# Patient Record
Sex: FEMALE | Race: WHITE
[De-identification: names, ages, dates, MRNs, and addresses within clinical notes are randomized per-mention and may not be internally consistent; named-entity substitution may affect disease eponyms.]

---

## 2019-11-12 ENCOUNTER — HOSPITAL ENCOUNTER (EMERGENCY)
Dept: HOSPITAL 41 - JD.ED | Age: 29
Discharge: HOME | End: 2019-11-12
Payer: SELF-PAY

## 2019-11-12 DIAGNOSIS — F17.210: ICD-10-CM

## 2019-11-12 DIAGNOSIS — Z30.432: Primary | ICD-10-CM

## 2019-11-12 NOTE — EDM.PDOC
<Juanita Aquino - Last Filed: 19 18:14>





ED HPI GENERAL MEDICAL PROBLEM





- General


Chief Complaint: OB/GYN Problem


Stated Complaint: OB/GYN PROBLEM


Time Seen by Provider: 19 17:42





- Related Data


 Allergies











Allergy/AdvReac Type Severity Reaction Status Date / Time


 


No Known Allergies Allergy   Verified 19 17:33











Home Meds: 


 Home Meds





. [No Known Home Meds]  19 [History]











Course





- Vital Signs


Last Recorded V/S: 


 Last Vital Signs











Temp  97.8 F   19 17:30


 


Pulse  109 H  19 17:30


 


Resp  19   19 17:30


 


BP  133/89   19 17:30


 


Pulse Ox  97   19 17:30














- Orders/Labs/Meds


Orders: 


 Active Orders 24 hr











 Category Date Time Status


 


 Pelvic Exam, Set Up [RC] ASDIRECTED Care  19 17:49 Active














Departure





- Departure


Time of Disposition: 18:15


Disposition: Home, Self-Care 01


Clinical Impression: 


 Encounter for removal of intrauterine contraceptive device (IUD)








- Discharge Information


*PRESCRIPTION DRUG MONITORING PROGRAM REVIEWED*: No


*COPY OF PRESCRIPTION DRUG MONITORING REPORT IN PATIENT RONNY: No


Instructions:  Intrauterine Device Information


Referrals: 


PCP,None [Primary Care Provider] - 


Forms:  ED Department Discharge


Additional Instructions: 


You were seen in the emergency department tonight with a request to have your 

IUD removed.  Your IUD was removed without complications.  There was no 

evidence of embedding within the uterus.  We do recommend that she use a 

barrier method of contraceptive until being seen by an OB/GYN provider to be 

started on oral contraceptives. You were provided with a list of OB/GYN 

providers.  Please call to schedule an appointment at their earliest available 

time. Return to the ER for any additional problems or concerns.





- My Orders


Last 24 Hours: 


My Active Orders





19 17:49


Pelvic Exam, Set Up [RC] ASDIRECTED 














- Assessment/Plan


Last 24 Hours: 


My Active Orders





19 17:49


Pelvic Exam, Set Up [RC] ASDIRECTED 














ED  PROCEDURES





- Additional/Other Procedure(s)


Procedure(s) (Free Text): 


Patient was placed in the lithotomy position.  Cervix was fully visualized 

utilizing lighted speculum. Both strings of the IUD  were visualized.  IUD was 

removed utilizing ring forceps.  No resistance was noted.   Patient tolerated 

procedure well.








<Jersey Ayala - Last Filed: 19 18:24>





ED HPI GENERAL MEDICAL PROBLEM





- General


Source of Information: Reports: Patient


History Limitations: Reports: No Limitations





- History of Present Illness


INITIAL COMMENTS - FREE TEXT/NARRATIVE: 





Patient's unfortunate 29-year-old  female who presents to emergency 

Department today with complaint of I want my IUD out. Patient reports that she 

has become concerned after reading on the Internet that her IUD may be a 

problem after eating in for so long. Patient reports that she had her IUD 

placed in . Patient is not looking to get pregnant however, she is afraid 

that  he will call scarring. No fever no chills no vaginal discharge no 

dysuria no frequency or urgency. Patient reports that she did have a chlamydia 

and gonorrhea test and HIV test 2 weeks ago which were all negative.





Past Medical History


OB/GYN History: Reports: Pregnancy


Psychiatric History: Reports: Anxiety, Depression





- Infectious Disease History


Infectious Disease History: Reports: Chicken Pox, Shingles





Social & Family History





- Family History


Family Medical History: Noncontributory





- Tobacco Use


Smoking Status *Q: Current Every Day Smoker


Years of Tobacco use: 13


Packs/Tins Daily: 0.5





- Caffeine Use


Caffeine Use: Reports: Coffee, Tea


Caffeine Use Comment: rarely





- Recreational Drug Use


Recreational Drug Use: No





ED ROS GENERAL





- Review of Systems


Review Of Systems: See Below


GI/Abdominal: Reports: No Symptoms


: Reports: No Symptoms





ED EXAM, GI/ABD





- Physical Exam


Exam: See Below


Exam Limited By: No Limitations


General Appearance: Alert, WD/WN, Mild Distress


Throat/Mouth: Normal Inspection, Normal Lips, Normal Teeth, Normal Gums, Normal 

Oropharynx, Normal Voice, No Airway Compromise


Neck: Normal Inspection, Supple, Non-Tender, Full Range of Motion


Respiratory/Chest: No Respiratory Distress, Lungs Clear, Normal Breath Sounds, 

No Accessory Muscle Use, Chest Non-Tender


Cardiovascular: Normal Peripheral Pulses, Regular Rate, Rhythm, No Edema, No 

Gallop, No JVD, No Murmur, No Rub


GI/Abdominal Exam: Normal Bowel Sounds, Soft, Non-Tender, No Organomegaly, No 

Distention, No Abnormal Bruit, No Mass, Pelvis Stable


Extremities: Normal Inspection, Normal Range of Motion, Non-Tender, Normal 

Capillary Refill, No Pedal Edema


Neurological: Alert, Oriented, CN II-XII Intact, Normal Cognition, Normal Gait, 

Normal Reflexes, No Motor/Sensory Deficits


Skin Exam: Warm, Dry, Intact, Normal Color, No Rash

## 2020-02-11 ENCOUNTER — HOSPITAL ENCOUNTER (EMERGENCY)
Dept: HOSPITAL 41 - JD.ED | Age: 30
Discharge: HOME | End: 2020-02-11
Payer: MEDICAID

## 2020-02-11 NOTE — EDM.PDOC
ED HPI GENERAL MEDICAL PROBLEM





- General


Chief Complaint: ENT Problem


Stated Complaint: POSSIBLE BROKEN NOSE


Time Seen by Provider: 02/11/20 20:03


Source of Information: Reports: Patient


History Limitations: Reports: No Limitations





- History of Present Illness


INITIAL COMMENTS - FREE TEXT/NARRATIVE: 


30-year-old female presents the ED for evaluation of blunt mid facial trauma 

primarily involving her nose. She states this occurred as she was entering her 

apartment building when the door open suddenly and struck her in the midface. 

She states that this is a large metal framed door.  backwards but not down 

to the ground. She had very active nose bleeding after the injury but it has 

subsequently stopped. She was able to blow up quite a bit of large clot. She 

was not aware of swallowing any blood. Has some pain at the bridge of her nose 

and is concerned that she has fractured her nose. She is not sure if she could 

be pregnant. She does not use any birth control. His any dental pain or some 

pain in her lip. There is some slight swelling of the right upper eyelid which 

she was not aware of. She does not wear glasses. Denies  being struck in the 

forehead at all.





Onset: Today


Onset Date: 02/11/20


Onset Time: 19:00


Duration: Minutes:


Location: Reports: Face


Quality: Reports: Ache


Severity: Moderate


Improves with: Reports: None


Worsens with: Reports: None


Context: Reports: Trauma (1 trauma from being struck by a large aluminum for 

steel door as she was entering it was opened by another patron of her apartment.

).  Denies: Activity, Exercise, Lifting, Sick Contact


Associated Symptoms: Reports: No Other Symptoms


Treatments PTA: Reports: Other (see below) (None.)


  ** Nose


Pain Score (Numeric/FACES): 5





- Related Data


 Allergies











Allergy/AdvReac Type Severity Reaction Status Date / Time


 


No Known Allergies Allergy   Verified 02/11/20 19:41











Home Meds: 


 Home Meds





. [No Known Home Meds]  11/12/19 [History]











Past Medical History


OB/GYN History: Reports: Pregnancy


Psychiatric History: Reports: Anxiety, Depression





- Infectious Disease History


Infectious Disease History: Reports: Chicken Pox, Shingles





Social & Family History





- Family History


Family Medical History: Noncontributory





- Caffeine Use


Caffeine Use: Reports: Coffee, Tea


Caffeine Use Comment: rarely





- Living Situation & Occupation


Living situation: Reports: Single


Occupation: Unemployed





ED ROS ENT





- Review of Systems


Review Of Systems: See Below


Constitutional: Reports: No Symptoms


HEENT: Reports: Nosebleed, Nose Pain


Respiratory: Reports: No Symptoms


Cardiovascular: Reports: No Symptoms


Endocrine: Reports: No Symptoms


GI/Abdominal: Reports: No Symptoms


: Reports: No Symptoms


Musculoskeletal: Reports: No Symptoms


Skin: Reports: No Symptoms


Neurological: Reports: No Symptoms





ED EXAM, ENT





- Physical Exam


Exam: See Below


Exam Limited By: No Limitations


General Appearance: Alert, WD/WN, Anxious, Mild Distress


Eye Exam: Bilateral Eye: Normal Inspection, PERRL, Other (There appears to be 

slight swelling of the lateral mid aspect of the right upper eyelid which the 

patient was not aware of.)


Nose: Other (There is no obvious deformity of the knee nose. Palpation does not 

reveal any deformities of the nasal septum or spine. [place] of the bridge of 

the nose. On inspection there is swelling of both medial and superior 

turbinates. On the left it is nearly closed and she has difficulty breathing 

through this side. On the right there is been some bleeding and menstrual 

bleeding mildly from the nasal septum with no septal hematoma.There is no 

fracture of the nose.)


Mouth/Throat: Normal Inspection, Normal Gums, Normal Lips, Normal Oropharynx, 

Normal Teeth, Other (No dental injuries.)


Head: Atraumatic ( Mandibular injuries.), Normocephalic


Neck: Normal Inspection, Supple, Non-Tender, Full Range of Motion.  No: 

Lymphadenopathy (L), Lymphadenopathy (R)





Course





- Vital Signs


Last Recorded V/S: 


 Last Vital Signs











Temp  37.2 C   02/11/20 19:35


 


Pulse  89   02/11/20 19:35


 


Resp  16   02/11/20 19:35


 


BP  123/86   02/11/20 19:35


 


Pulse Ox  99   02/11/20 19:35














- Radiology Interpretation


Free Text/Narrative:: 


30-year-old female presents to the ED with acute injury to the nose from a 

steel frame door opened right in front of her eyes she was entering her 

apartment. The door struck her in the midface primarily her nose causing nasal 

bleeding. Concern that she may have fractured her nose. On examination she 

moves her nose around very easily without any pain. On palpation clinically 

there is no nasal fracture. There is swelling on the anterior aspect of the 

nose without nasal septal hematoma. However the vomer appears to be midline. 

There is no pain in the maxillary sinuses or periorbital areas. Patient 

reassured clinically there is no fractured nose. Bleeding will stop on its own 

but congestion may last a couple of days. Follow  up as needed.








Departure





- Departure


Time of Disposition: 20:03


Disposition: Home, Self-Care 01


Condition: Fair


Clinical Impression: 


 Contusion of nose, initial encounter








- Discharge Information


*PRESCRIPTION DRUG MONITORING PROGRAM REVIEWED*: Not Applicable


*COPY OF PRESCRIPTION DRUG MONITORING REPORT IN PATIENT RONNY: Not Applicable


Instructions:  Contusion, Easy-to-Read


Referrals: 


PCP,None [Primary Care Provider] - 


Forms:  ED Department Discharge


Additional Instructions: 


Evaluation the emergency room tonight in regards to blunt trauma to the nose 

that occurred from a heavy metal door that struck you in the midface injuring 

her nose. Examination reveals that the nose clinically is not fractured. There 

is swelling on both sides of the naris particularly on the left side where it 

is narrowed from swelling. On the right side is where it has been bleeding 

from. No septal hematoma. Clinically I do not feel x-rays are indicated. 

Swelling and mucosal take a couple of days to go down and you should bale to 

breathe normally again. May be very tender to touch for up to a week to 10 

days. May get some blackening under both eyes from contusion to the nose. He 

will take a week to 10 days to go away. No or Motrin for pain relief if needed. 





Sepsis Event Note





- Evaluation


Sepsis Screening Result: No Definite Risk





- Focused Exam


Vital Signs: 


 Vital Signs











  Temp Pulse Resp BP Pulse Ox


 


 02/11/20 19:35  37.2 C  89  16  123/86  99











Date Exam was Performed: 02/11/20


Time Exam was Performed: 20:22

## 2021-03-14 ENCOUNTER — HOSPITAL ENCOUNTER (EMERGENCY)
Dept: HOSPITAL 41 - JD.ED | Age: 31
Discharge: HOME | End: 2021-03-14
Payer: MEDICAID

## 2021-03-14 DIAGNOSIS — K04.7: Primary | ICD-10-CM

## 2021-03-14 DIAGNOSIS — Z72.0: ICD-10-CM

## 2021-03-14 NOTE — EDM.PDOC
ED HPI GENERAL MEDICAL PROBLEM





- General


Chief Complaint: ENT Problem


Stated Complaint: TOOTH PAIN


Time Seen by Provider: 03/14/21 21:17


Source of Information: Reports: Patient


History Limitations: Reports: No Limitations





- History of Present Illness


INITIAL COMMENTS - FREE TEXT/NARRATIVE: 


81-year-old female presents to the ED complaining of dental pain right upper 

molars.  She cannot be sure whether this is the third or second molar because 

both of them are hurting.  No recent dental surgery on either tooth.  She has 

had previous fillings to these teeth.  The first molar is an implant.  Pain 

radiates up underneath her right eye and also down towards the mandible on the 

right side.  Difficult to chew on this side for the last 3 to 4 days.  Has been 

taking Motrin /Tylenol without any relief of the pain.





Onset: Gradual


Onset Date: 03/12/21


Duration: Day(s):, Getting Worse


Location: Reports: Face


Quality: Reports: Ache, Throbbing, Other (Upper molar teeth.  Stating pounding 

pain)


Severity: Moderate


Improves with: Reports: None (10)


Worsens with: Reports: Other


Associated Symptoms: Denies: No Other Symptoms, Confusion (To chew on that 

side.), Chest Pain, Cough, cough w sputum, Diaphoresis, Fever/Chills, Headaches,

Loss of Appetite, Malaise, Nausea/Vomiting, Seizure, Shortness of Breath, 

Syncope, Weakness


Treatments PTA: Reports: Acetaminophen, NSAIDS


  ** Right Lower Tooth/Teeth


Pain Score (Numeric/FACES): 9





- Related Data


                                    Allergies











Allergy/AdvReac Type Severity Reaction Status Date / Time


 


No Known Allergies Allergy   Verified 03/14/21 21:11











Home Meds: 


                                    Home Meds





Amoxicillin/Potassium Clav [Augmentin 875-125 Tablet] 1 each PO BID #20 tablet 

03/14/21 [Rx]


oxyCODONE HCl/Acetaminophen [Percocet 5-325 mg Tablet] 1 - 2 each PO Q4H PRN #18

tablet 03/14/21 [Rx]











Past Medical History





- Past Health History


Medical/Surgical History: Denies Medical/Surgical History


OB/GYN History: Reports: Pregnancy


Psychiatric History: Reports: Anxiety, Depression





- Infectious Disease History


Infectious Disease History: Reports: Chicken Pox, Shingles





Social & Family History





- Family History


Family Medical History: No Pertinent Family History





- Tobacco Use


Tobacco Use Status *Q: Current Every Day Tobacco User


Years of Tobacco use: 10


Packs/Tins Daily: 0.5





- Caffeine Use


Caffeine Use: Reports: Coffee


Caffeine Use Comment: rarely





- Living Situation & Occupation


Living situation: Reports: Single


Occupation: Unemployed





ED ROS ENT





- Review of Systems


Review Of Systems: See Below


Constitutional: Reports: Decreased Appetite (Belted shoe on the right side.).  

Denies: Fever, Chills, Malaise, Weakness, Fatigue, Weight Loss


HEENT: Reports: Dental Pain (Right upper molars are painful she believes third 

and second.  The first molar apparently is an implant.)


Respiratory: Reports: No Symptoms


Cardiovascular: Reports: No Symptoms


Endocrine: Reports: No Symptoms


GI/Abdominal: Reports: No Symptoms


: Reports: No Symptoms


Musculoskeletal: Reports: No Symptoms


Skin: Reports: No Symptoms


Neurological: Reports: No Symptoms


Psychiatric: Reports: No Symptoms


Hematologic/Lymphatic: Reports: No Symptoms





ED EXAM, ENT





- Physical Exam


Exam: See Below


Exam Limited By: No Limitations


General Appearance: Alert, WD/WN, Moderate Distress, Other (Temperature is 36.5 

degrees with a heart rate of 70.  O2 sats are 94% room air /88.  

Respiratory to 16.)


Ears: Normal External Exam


Nose: Normal Inspection


Mouth/Throat: Dental Pain (To pain on percussion with a tongue blade primarily 

from the third right upper molar tooth.).  No: Gum Swelling


Head: Atraumatic, Normocephalic


Neck: Normal Inspection, Supple, Non-Tender, Full Range of Motion, 

Lymphadenopathy (R).  No: Lymphadenopathy (L) (Right submandibular adenopathy.)


Cardiovascular: Normal Peripheral Pulses, Regular Rate, Rhythm, No Edema, No 

Gallop, No Murmur, No Rub





Course





- Vital Signs


Last Recorded V/S: 


                                Last Vital Signs











Temp  36.5 C   03/14/21 21:09


 


Pulse  70   03/14/21 21:09


 


Resp  16   03/14/21 21:09


 


BP  132/88   03/14/21 21:09


 


Pulse Ox  94 L  03/14/21 21:09














- Radiology Interpretation


Free Text/Narrative:: 


31-year-old presents to the ED with a gradually worsening dental pain coming 

from her right upper second and third molar teeth for the last 3 to 4 days.  She

 has had no recent dental work done.  Both of them had a bad fillings placed in 

the past.  Apparently the right first upper molar tooth is a implant.  Pain is 

constant throbbing and pulsating rating up underneath her right eye and down to 

her right mandible and slightly into the right anterior neck.  She cannot chew 

on the side at all.  She is difficulty being able to isolate which tooth the 

pain is coming from.  On examination it appears it is coming from the right 

third molar tooth.  Plan placed on Augmentin 8 7 5/125 mg tablet twice daily for

 the next 10 days out of the [employer] machine.  Normally I would use only 

500/125 mg tablets but they are not available in the [employer] machine.  She will

 use Motrin 600 mg every 6 hours and Percocet 5/325 mg tabs 1 or 2 every 4-6 

hours necessary for pain relief with the Motrin tablet until she can get into 

the dentist.








Departure





- Departure


Time of Disposition: 21:23


Disposition: Home, Self-Care 01


Condition: Fair


Clinical Impression: 


 Dental abscess








- Discharge Information


*PRESCRIPTION DRUG MONITORING PROGRAM REVIEWED*: Not Applicable


*COPY OF PRESCRIPTION DRUG MONITORING REPORT IN PATIENT RONNY: Not Applicable


Prescriptions: 


Amoxicillin/Potassium Clav [Augmentin 875-125 Tablet] 1 each PO BID #20 tablet


oxyCODONE HCl/Acetaminophen [Percocet 5-325 mg Tablet] 1 - 2 each PO Q4H PRN #18

tablet


 PRN Reason: pain relief. 


Instructions:  Dental Abscess, Easy-to-Read


Referrals: 


Glenny Mendoza MD [Primary Care Provider] - 


Forms:  ED Department Discharge


Additional Instructions: 


Evaluation in the emergency room today in regards to increasing dental pain 

coming from the right upper third molar tooth.  It appears there is likely a 

dental carry in the very posterior aspect of this tooth which is introduced 

infection into the tooth and root.  Treatment to be antibiotic Augmentin 875/125

mg 1 tablet twice daily for the next 10 days to clear up infection.  Continue 

Motrin 600 mg every 6 hours or Aleve 2 tablets every 8 hours to relieve pain and

inflammation.Percocet tabs 5/325mg 1-2 tabs by mouth every 4hrs as needed for 

pain relief.  Follow-up with dentist as soon as able.





Sepsis Event Note (ED)





- Evaluation


Sepsis Screening Result: No Definite Risk





- Focused Exam


Vital Signs: 


                                   Vital Signs











  Temp Pulse Resp BP Pulse Ox


 


 03/14/21 21:09  36.5 C  70  16  132/88  94 L

## 2021-06-27 ENCOUNTER — HOSPITAL ENCOUNTER (EMERGENCY)
Dept: HOSPITAL 41 - JD.ED | Age: 31
Discharge: HOME | End: 2021-06-27
Payer: MEDICAID

## 2021-06-27 DIAGNOSIS — Z72.0: ICD-10-CM

## 2021-06-27 DIAGNOSIS — K02.9: Primary | ICD-10-CM

## 2021-06-27 NOTE — EDM.PDOC
ED HPI GENERAL MEDICAL PROBLEM





- General


Chief Complaint: ENT Problem


Stated Complaint: DENTAL COMPLAINT


Time Seen by Provider: 06/27/21 10:38





- History of Present Illness


INITIAL COMMENTS - FREE TEXT/NARRATIVE: 





31-year-old female presents the emergency room with dental pain.





For the last several days patient's had significant pain on her right upper and 

lower posterior teeth.  She has 1 tooth that is falling apart and when she tries

to floss it the floss goes between the fragments of the teeth.  Patient has not 

had any facial swelling no fevers or chills.  The patient is having a hard time 

getting into see a dentist because of financial restraints.


  ** Right Upper Tooth/Teeth


Pain Score (Numeric/FACES): 7





- Related Data


                                    Allergies











Allergy/AdvReac Type Severity Reaction Status Date / Time


 


No Known Allergies Allergy   Verified 06/27/21 09:34











Home Meds: 


                                    Home Meds





ALPRAZolam 2 mg PO BID PRN 06/27/21 [History]


Acetaminophen/HYDROcodone [Norco 325-5 MG] 1 - 2 tab PO Q6H PRN #10 tab 06/27/21

[Rx]


Amoxicillin 500 mg PO TID #30 tab 06/27/21 [Rx]











Past Medical History





- Past Health History


Medical/Surgical History: Denies Medical/Surgical History


Other HEENT History: dental caries.


OB/GYN History: Reports: Pregnancy


Musculoskeletal History: Reports: Fracture


Psychiatric History: Reports: Anxiety, Depression





- Infectious Disease History


Infectious Disease History: Reports: Chicken Pox, Shingles





Social & Family History





- Family History


Family Medical History: No Pertinent Family History





- Tobacco Use


Tobacco Use Status *Q: Current Every Day Tobacco User


Years of Tobacco use: 13


Packs/Tins Daily: 0.5





- Caffeine Use


Caffeine Use: Reports: Coffee


Caffeine Use Comment: rarely





- Recreational Drug Use


Recreational Drug Use: No





- Living Situation & Occupation


Living situation: Reports: Single


Occupation: Unemployed





ED ROS GENERAL





- Review of Systems


Review Of Systems: See Below


Constitutional: Reports: No Symptoms


HEENT: Reports: Dental Pain


Respiratory: Reports: No Symptoms


Cardiovascular: Reports: No Symptoms


GI/Abdominal: Reports: No Symptoms





ED EXAM, GENERAL





- Physical Exam


Exam: See Below


Exam Limited By: No Limitations


General Appearance: Alert, No Apparent Distress


Eye Exam: Bilateral Eye: Normal Inspection


Ears: Normal External Exam, Normal Canal, Hearing Grossly Normal, Normal TMs


Nose: Normal Inspection, Normal Mucosa, No Blood


Throat/Mouth: Normal Inspection, Normal Lips, Normal Oropharynx, Normal Voice, 

Other (She has multiple teeth in poor repair she is got some gum swelling in the

 tender area on the right side of upper and to lesser degree lower jaw.  Nothing

 to drain at this time.)


Head: Other


Neck: Normal Inspection, Supple, Non-Tender.  No: Lymphadenopathy (L), 

Lymphadenopathy (R)


Respiratory/Chest: No Respiratory Distress, Lungs Clear, Normal Breath Sounds


Cardiovascular: Regular Rate, Rhythm, No Edema, No Murmur





Course





- Vital Signs


Last Recorded V/S: 


                                Last Vital Signs











Temp  36.3 C   06/27/21 09:25


 


Pulse  84   06/27/21 09:25


 


Resp  16   06/27/21 09:25


 


BP  117/87   06/27/21 09:25


 


Pulse Ox  97   06/27/21 09:25














Departure





- Departure


Time of Disposition: 10:49


Disposition: Home, Self-Care 01


Clinical Impression: 


 Dental caries








- Discharge Information


Referrals: 


Teodora Skaggs NP [Primary Care Provider] - 


Forms:  ED Department Discharge


Additional Instructions: 


Return to the emergency room with any questions problems or worsening symptoms.





Follow-up with your dentist as soon as you can.





[facility] 

[address] they are open between noon today 4:00 PM.  A handwritten prescript

ion for hydrocodone has been given to you.





The mainstay for pain control should be Tylenol and Motrin.  Do not exceed 4000 

mg of Tylenol, or acetaminophen daily.  The Norco, each tablet contains 325 mg 

of acetaminophen, or Tylenol





Sepsis Event Note (ED)





- Evaluation


Sepsis Screening Result: No Definite Risk





- Focused Exam


Vital Signs: 


                                   Vital Signs











  Temp Pulse Resp BP Pulse Ox


 


 06/27/21 09:25  36.3 C  84  16  117/87  97

## 2021-09-10 ENCOUNTER — HOSPITAL ENCOUNTER (EMERGENCY)
Dept: HOSPITAL 41 - JD.ED | Age: 31
Discharge: HOME | End: 2021-09-10
Payer: MEDICAID

## 2021-09-10 DIAGNOSIS — F17.210: ICD-10-CM

## 2021-09-10 DIAGNOSIS — K04.7: Primary | ICD-10-CM

## 2021-09-10 DIAGNOSIS — K02.9: ICD-10-CM

## 2021-09-10 NOTE — EDM.PDOC
ED HPI GENERAL MEDICAL PROBLEM





- General


Chief Complaint: General


Stated Complaint: TOOTH ACHE


Time Seen by Provider: 09/10/21 01:26


Source of Information: Reports: Patient, Old Records (ED visits 3/14/2021, 

6/27/2021)


History Limitations: Reports: No Limitations





- History of Present Illness


INITIAL COMMENTS - FREE TEXT/NARRATIVE: 





Ms. Saul is a very pleasant 31-year-old woman who now presents the ED stating 

that she has had right upper dental pain for more than a year, with "new" lower 

right dental pain.  She states that she felt some fluid drainage just prior to 

arrival to the ED.  No recent fever.  She states that her pain is inadequately 

treated by ibuprofen.





Review of medical records finds that the patient was seen in this ED with upper 

right dental pain on 3/14/2021.  She was given 2 tablets of Percocet 5/325, and 

prescribed an additional 18.  She was seen again in this ED on 6/27/2021 with a 

complaint of both upper and lower right dental pain.  She was prescribed 10 

tablets of Norco 5/325.  When asked about that, the patient states that she does

not have the financial ability to see a dentist.  She states that her poor 

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 

any since 2018.





Here in the ED this morning, the patient is found to be hemodynamically stable, 

afebrile, saturating 98% on room air.  She appears to be relatively comfortable,

in no acute distress.





Other than her dental issue, the patient states that she has had cold symptoms, 

including nasal congestion and a cough, since yesterday.  Otherwise, the patient

denies having a recent fever, chills, sore throat, ear pain, dyspnea, chest 

pain, palpitations, nausea, vomiting, constipation, diarrhea, abdominal pain, 

urinary symptoms, recent weight gain or weight loss, recent bloody bowel 

movements or black bowel movements, recent joint aches, headaches, or rashes.








The patient's PCP is Teodora Skaggs NP.


She has not received a COVID vaccination.








  ** Right Lower Tooth/Teeth


Pain Score (Numeric/FACES): 10





- Related Data


                                    Allergies











Allergy/AdvReac Type Severity Reaction Status Date / Time


 


No Known Allergies Allergy   Verified 09/10/21 01:33











Home Meds: 


                                    Home Meds





ALPRAZolam 2 mg PO BID PRN 06/27/21 [History]


Mirtazapine [Remeron] 15 mg PO BEDTIME 09/10/21 [History]











Past Medical History


Psychiatric History: Reports: Anxiety (untreated), Depression (untreated)





- Infectious Disease History


Infectious Disease History: Reports: Chicken Pox, Shingles





Social & Family History





- Tobacco Use


Years of Tobacco use: 14


Packs/Tins Daily: 0.5


Tobacco Use Comment: Started smoking 2007





- Caffeine Use


Caffeine Use: Reports: Coffee


Caffeine Use Comment: rarely





- Alcohol Use


Alcohol Use History: Yes


Alcohol Use Frequency: Rarely





- Recreational Drug Use


Recreational Drug Use: Yes


Drug Use in Last 12 Months: No


Recreational Drug Type: Reports: Marijuana/Hashish (last smoked around 2011), 

Methamphetamine (last smoked 2018)





- Living Situation & Occupation


Living situation: Reports: Single, with Family (Son)


Occupation: Unemployed





ED ROS GENERAL





- Review of Systems


Review Of Systems: Comprehensive ROS is negative, except as noted in HPI.





ED EXAM, GENERAL





- Physical Exam


Exam: See Below


Exam Limited By: No Limitations


General Appearance: Alert, WD/WN, No Apparent Distress


Eye Exam: Bilateral Eye: EOMI, Normal Inspection


Ears: Normal External Exam, Normal Canal, Hearing Grossly Normal, Normal TMs


Nose: Normal Inspection, Normal Mucosa, No Blood


Throat/Mouth: Normal Lips, Normal Oropharynx, Normal Voice, No Airway 

Compromise, Other (Tooth #1 absent.  Tooth #2 with extensive decay.  Tooth #3 

with a crown.  The posterior aspect of tooth #29 is decayed.  Tooth #30 absent. 

 Tooth #31 with filling.  Tooth #32 absent.  Minimal, if any, gingival swelling.

  No pointing seen.)


Head: Atraumatic, Normocephalic


Neck: Normal Inspection, Supple, Non-Tender, Full Range of Motion.  No: 

Lymphadenopathy (L), Lymphadenopathy (R)





Course





- Vital Signs


Last Recorded V/S: 


                                Last Vital Signs











Temp  36.9 C   09/10/21 01:26


 


Pulse  85   09/10/21 01:26


 


Resp  16   09/10/21 01:26


 


BP  123/87   09/10/21 01:26


 


Pulse Ox  98   09/10/21 01:26














- Re-Assessments/Exams


Free Text/Narrative Re-Assessment/Exam: 





09/10/21 01:48


The patient has severe decay of tooth #2 and of the posterior aspect of tooth 

#29.  No obvious infection, but she describes an oral drainage just prior to 

coming to the ED.  Unfortunately, this is the patient's third visit to the ED 

for the same problem, and she has not followed up with a dentist.  She 

acknowledges that she was told on 6/27/2021 that the ED is not in a position to 

refill her medications.  Reluctantly, I will prescribe penicillin and a small 

quantity of Norco 5/325 via [facility].  She will be given a list of local 

dentists to follow-up with, and was reminded again that the ED is not in a 

position to refill medications.  She needs to see a dentist.











Departure





- Departure


Time of Disposition: 01:50


Disposition: Home, Self-Care 01


Condition: Good


Clinical Impression: 


 Dental infection, Dentalgia








- Discharge Information


*PRESCRIPTION DRUG MONITORING PROGRAM REVIEWED*: Not Applicable


*COPY OF PRESCRIPTION DRUG MONITORING REPORT IN PATIENT RONNY: Not Applicable


Referrals: 


Teodora Skaggs NP [Primary Care Provider] - 


Forms:  ED Department Discharge


Additional Instructions: 


You were seen in the emergency room for continued upper and lower right dental 

pain.





On examination, you have decay of tooth #2 and the posterior side of tooth #29. 

You may have an associated infection.





Prescriptions for the antibiotic penicillin and the narcotic pain reliever Norco

have been provided to you via [facility].





Take 1 tablet of penicillin every 6 hours, starting now, as prescribed.  Finish 

the entire prescription unless told otherwise by dentist.





We recommend you take over-the-counter ibuprofen, 3 tablets (600 mg) every 8 

hours, with food, around-the-clock.





You may take 1 to 2 tablets of Norco up to every 6 hours, as needed for pain not

relieved by ibuprofen.  If you take Norco, do not drive or operate heavy mac

hinery for 12 hours afterwards.  Norco may cause constipation, so consider 

taking a stool softener.





You have been provided with a list of local dentist.  As discussed, it is 

IMPERATIVE that you follow-up with a dentist within the next 10 days.





Please be aware that the emergency department cannot refill prescriptions, 

including antibiotics or narcotic pain relievers.  For this, you will need to s

ee a dentist.





If any other problems, please do not hesitate to return to the ER.





Sepsis Event Note (ED)





- Evaluation


Sepsis Screening Result: No Definite Risk





- Focused Exam


Vital Signs: 


                                   Vital Signs











  Temp Pulse Resp BP Pulse Ox


 


 09/10/21 01:26  36.9 C  85  16  123/87  98